# Patient Record
Sex: FEMALE | Race: OTHER | HISPANIC OR LATINO
[De-identification: names, ages, dates, MRNs, and addresses within clinical notes are randomized per-mention and may not be internally consistent; named-entity substitution may affect disease eponyms.]

---

## 2018-10-19 PROBLEM — Z00.00 ENCOUNTER FOR PREVENTIVE HEALTH EXAMINATION: Status: ACTIVE | Noted: 2018-10-19

## 2018-12-06 ENCOUNTER — APPOINTMENT (OUTPATIENT)
Dept: INTERNAL MEDICINE | Facility: CLINIC | Age: 53
End: 2018-12-06

## 2019-10-20 ENCOUNTER — RESULT REVIEW (OUTPATIENT)
Age: 54
End: 2019-10-20

## 2019-10-20 ENCOUNTER — INPATIENT (INPATIENT)
Facility: HOSPITAL | Age: 54
LOS: 0 days | Discharge: HOME | End: 2019-10-21
Attending: OBSTETRICS & GYNECOLOGY | Admitting: OBSTETRICS & GYNECOLOGY
Payer: COMMERCIAL

## 2019-10-20 VITALS
RESPIRATION RATE: 17 BRPM | TEMPERATURE: 97 F | DIASTOLIC BLOOD PRESSURE: 71 MMHG | OXYGEN SATURATION: 100 % | SYSTOLIC BLOOD PRESSURE: 128 MMHG | HEART RATE: 89 BPM

## 2019-10-20 LAB
ALBUMIN SERPL ELPH-MCNC: 4.2 G/DL — SIGNIFICANT CHANGE UP (ref 3.5–5.2)
ALP SERPL-CCNC: 63 U/L — SIGNIFICANT CHANGE UP (ref 30–115)
ALT FLD-CCNC: 9 U/L — SIGNIFICANT CHANGE UP (ref 0–41)
ANION GAP SERPL CALC-SCNC: 14 MMOL/L — SIGNIFICANT CHANGE UP (ref 7–14)
AST SERPL-CCNC: 14 U/L — SIGNIFICANT CHANGE UP (ref 0–41)
BASOPHILS # BLD AUTO: 0.03 K/UL — SIGNIFICANT CHANGE UP (ref 0–0.2)
BASOPHILS NFR BLD AUTO: 0.7 % — SIGNIFICANT CHANGE UP (ref 0–1)
BILIRUB SERPL-MCNC: 0.3 MG/DL — SIGNIFICANT CHANGE UP (ref 0.2–1.2)
BLD GP AB SCN SERPL QL: SIGNIFICANT CHANGE UP
BUN SERPL-MCNC: 10 MG/DL — SIGNIFICANT CHANGE UP (ref 10–20)
CALCIUM SERPL-MCNC: 8.9 MG/DL — SIGNIFICANT CHANGE UP (ref 8.5–10.1)
CHLORIDE SERPL-SCNC: 103 MMOL/L — SIGNIFICANT CHANGE UP (ref 98–110)
CO2 SERPL-SCNC: 22 MMOL/L — SIGNIFICANT CHANGE UP (ref 17–32)
CREAT SERPL-MCNC: 0.7 MG/DL — SIGNIFICANT CHANGE UP (ref 0.7–1.5)
EOSINOPHIL # BLD AUTO: 0.2 K/UL — SIGNIFICANT CHANGE UP (ref 0–0.7)
EOSINOPHIL NFR BLD AUTO: 4.4 % — SIGNIFICANT CHANGE UP (ref 0–8)
GLUCOSE SERPL-MCNC: 104 MG/DL — HIGH (ref 70–99)
HCT VFR BLD CALC: 30.4 % — LOW (ref 37–47)
HCT VFR BLD CALC: 34.1 % — LOW (ref 37–47)
HGB BLD-MCNC: 10.5 G/DL — LOW (ref 12–16)
HGB BLD-MCNC: 11.8 G/DL — LOW (ref 12–16)
IMM GRANULOCYTES NFR BLD AUTO: 0.2 % — SIGNIFICANT CHANGE UP (ref 0.1–0.3)
LYMPHOCYTES # BLD AUTO: 1 K/UL — LOW (ref 1.2–3.4)
LYMPHOCYTES # BLD AUTO: 22.2 % — SIGNIFICANT CHANGE UP (ref 20.5–51.1)
MCHC RBC-ENTMCNC: 29.4 PG — SIGNIFICANT CHANGE UP (ref 27–31)
MCHC RBC-ENTMCNC: 29.4 PG — SIGNIFICANT CHANGE UP (ref 27–31)
MCHC RBC-ENTMCNC: 34.5 G/DL — SIGNIFICANT CHANGE UP (ref 32–37)
MCHC RBC-ENTMCNC: 34.6 G/DL — SIGNIFICANT CHANGE UP (ref 32–37)
MCV RBC AUTO: 84.8 FL — SIGNIFICANT CHANGE UP (ref 81–99)
MCV RBC AUTO: 85.2 FL — SIGNIFICANT CHANGE UP (ref 81–99)
MONOCYTES # BLD AUTO: 0.32 K/UL — SIGNIFICANT CHANGE UP (ref 0.1–0.6)
MONOCYTES NFR BLD AUTO: 7.1 % — SIGNIFICANT CHANGE UP (ref 1.7–9.3)
NEUTROPHILS # BLD AUTO: 2.95 K/UL — SIGNIFICANT CHANGE UP (ref 1.4–6.5)
NEUTROPHILS NFR BLD AUTO: 65.4 % — SIGNIFICANT CHANGE UP (ref 42.2–75.2)
NRBC # BLD: 0 /100 WBCS — SIGNIFICANT CHANGE UP (ref 0–0)
NRBC # BLD: 0 /100 WBCS — SIGNIFICANT CHANGE UP (ref 0–0)
PLATELET # BLD AUTO: 202 K/UL — SIGNIFICANT CHANGE UP (ref 130–400)
PLATELET # BLD AUTO: 234 K/UL — SIGNIFICANT CHANGE UP (ref 130–400)
POTASSIUM SERPL-MCNC: 4.1 MMOL/L — SIGNIFICANT CHANGE UP (ref 3.5–5)
POTASSIUM SERPL-SCNC: 4.1 MMOL/L — SIGNIFICANT CHANGE UP (ref 3.5–5)
PROT SERPL-MCNC: 6.8 G/DL — SIGNIFICANT CHANGE UP (ref 6–8)
RBC # BLD: 3.57 M/UL — LOW (ref 4.2–5.4)
RBC # BLD: 4.02 M/UL — LOW (ref 4.2–5.4)
RBC # FLD: 12.8 % — SIGNIFICANT CHANGE UP (ref 11.5–14.5)
RBC # FLD: 12.9 % — SIGNIFICANT CHANGE UP (ref 11.5–14.5)
SODIUM SERPL-SCNC: 139 MMOL/L — SIGNIFICANT CHANGE UP (ref 135–146)
WBC # BLD: 4.51 K/UL — LOW (ref 4.8–10.8)
WBC # BLD: 5.05 K/UL — SIGNIFICANT CHANGE UP (ref 4.8–10.8)
WBC # FLD AUTO: 4.51 K/UL — LOW (ref 4.8–10.8)
WBC # FLD AUTO: 5.05 K/UL — SIGNIFICANT CHANGE UP (ref 4.8–10.8)

## 2019-10-20 PROCEDURE — 58100 BIOPSY OF UTERUS LINING: CPT

## 2019-10-20 PROCEDURE — 88305 TISSUE EXAM BY PATHOLOGIST: CPT | Mod: 26

## 2019-10-20 PROCEDURE — 99285 EMERGENCY DEPT VISIT HI MDM: CPT

## 2019-10-20 PROCEDURE — 76830 TRANSVAGINAL US NON-OB: CPT | Mod: 26

## 2019-10-20 PROCEDURE — 99222 1ST HOSP IP/OBS MODERATE 55: CPT | Mod: 25,GC

## 2019-10-20 RX ORDER — ONDANSETRON 8 MG/1
4 TABLET, FILM COATED ORAL EVERY 6 HOURS
Refills: 0 | Status: DISCONTINUED | OUTPATIENT
Start: 2019-10-20 | End: 2019-10-21

## 2019-10-20 RX ORDER — SODIUM CHLORIDE 9 MG/ML
1000 INJECTION, SOLUTION INTRAVENOUS
Refills: 0 | Status: DISCONTINUED | OUTPATIENT
Start: 2019-10-20 | End: 2019-10-21

## 2019-10-20 RX ORDER — SODIUM CHLORIDE 9 MG/ML
1000 INJECTION, SOLUTION INTRAVENOUS ONCE
Refills: 0 | Status: COMPLETED | OUTPATIENT
Start: 2019-10-20 | End: 2019-10-20

## 2019-10-20 RX ORDER — ACETAMINOPHEN 500 MG
650 TABLET ORAL ONCE
Refills: 0 | Status: COMPLETED | OUTPATIENT
Start: 2019-10-20 | End: 2019-10-20

## 2019-10-20 RX ADMIN — Medication 650 MILLIGRAM(S): at 18:51

## 2019-10-20 RX ADMIN — SODIUM CHLORIDE 1000 MILLILITER(S): 9 INJECTION, SOLUTION INTRAVENOUS at 12:35

## 2019-10-20 RX ADMIN — SODIUM CHLORIDE 150 MILLILITER(S): 9 INJECTION, SOLUTION INTRAVENOUS at 18:51

## 2019-10-20 RX ADMIN — ONDANSETRON 4 MILLIGRAM(S): 8 TABLET, FILM COATED ORAL at 20:42

## 2019-10-20 RX ADMIN — SODIUM CHLORIDE 1000 MILLILITER(S): 9 INJECTION, SOLUTION INTRAVENOUS at 13:35

## 2019-10-20 NOTE — ED PROVIDER NOTE - PROGRESS NOTE DETAILS
dionte caldwell, evaluated the patient, did endometrial scraping. pending obgyn dispo cleared for dc by obgyluis, pt to follow up with dr jordan tomorrow. Patient to be discharged from ED. Any available test results were discussed with patient and/or family and/or caregiver. Verbal instructions given, including instructions to return to ED immediately for any new, worsening, or concerning symptoms. Patient and/or family and/or caregiver endorsed understanding. Written discharge instructions additionally given, including follow-up plan. cleared for dc by martha caldwell to follow up with dr jordan tomorrow. repeat cbc noted, obgyn paged dr jacquelyn park, will admit to his service

## 2019-10-20 NOTE — ED ADULT NURSE NOTE - OBJECTIVE STATEMENT
Pt c/o vaginal bleeding which has increased over the past 3 days. Pt has not menstruated in a year. Denies fever, chills, painful urination, or back pain.

## 2019-10-20 NOTE — ED PROVIDER NOTE - PHYSICAL EXAMINATION
CONSTITUTIONAL: Well-developed; well-nourished; in no acute distress, speaking in full sentences  SKIN: warm, dry  HEAD: Normocephalic; atraumatic  EYES: PERRL, EOMI, no conjunctival erythema  ENT: No nasal discharge; airway clear, mucous membranes moist  NECK: Supple; non tender, FROM  CARD: no cyanosis, radial 2+  RESP: No increased wob  ABD: soft ntnd, no rebound, no guarding, no rigidity, neg murphys  EXT: moves all extremities, ambulates wo assistance No clubbing, cyanosis or edema.   NEURO: Alert, oriented, grossly unremarkable, no focal deficits, cn ii-xii grossly intact  PSYCH: Cooperative, appropriate   plevic exam done with chaperone present: no cmt nor adnexal tenderness, +blood pool in vault

## 2019-10-20 NOTE — CONSULT NOTE ADULT - ASSESSMENT
53yo postmenopausal P2, with vaginal bleeding, currently hemodynamically and clinically stable, s/p endometrial biopsy.  -f/u with Dr. Angelo on 10/21   -Bleeding precautions given  -f/u Endometrial biopsy   -Motrin for pain 600mg q6 hours as needed   -Disposition per ED     Dr. Garcia and Dr. Leyva aware 53yo postmenopausal P2, with vaginal bleeding, currently hemodynamically and clinically stable, s/p endometrial biopsy.  -F/u with Dr. Angelo on 10/21  -Bleeding precautions given  -F/u Endometrial biopsy  -Motrin for pain 600mg q6 hours as needed  -Disposition per ED    Dr. Garcia and Dr. Leyva aware

## 2019-10-20 NOTE — CONSULT NOTE ADULT - SUBJECTIVE AND OBJECTIVE BOX
Chief Complaint: Vaginal bleeding    HPI: 52yo , postmenopausal, with vaginal bleeding for 2 days. She reports a total of 7 pads per day for 2 days, that started on 10/19 in the AM. She reports sharp, RLQ abdominal pain that started friday night. She denies abnormal or foul smelling discharge. She denies vaginal bleeding in the past, and denies lightheadedness or dizziness. Reports hx of endometrial biopsy 1 year ago for heavy bleeding, normal per patient.  Denies Hormonal therapy, DVT, heart disease or CVA history. Denies HA, CP, SOB, N/V, LE pain/ swelling, diarrhea, pain/difficulty with urination, fevers, chills. Follows with Dr. Angelo.      Ob/Gyn History:  , LMP 2018; NSVDX2 at [facility]   Denies history of ovarian cysts, uterine fibroids, abnormal paps, or STIs  Last Pap Smear -   Last Mammogram - 2018  Last Colonoscopy - Not done     Denies the following: constitutional symptoms, visual symptoms, cardiovascular symptoms, respiratory symptoms, GI symptoms, musculoskeletal symptoms, skin symptoms, neurologic symptoms, hematologic symptoms, allergic symptoms, psychiatric symptoms  Except any pertinent positives listed.     Home Medications: None    Allergies: No Known Allergies    PAST MEDICAL & SURGICAL HISTORY:   Denies     FAMILY HISTORY: Reports Maternal gastric cancer      SOCIAL HISTORY: Denies cigarette use, alcohol use, or illicit drug use    Vital Signs Last 24 Hrs  T(F): 97.3 (20 Oct 2019 11:47), Max: 97.3 (20 Oct 2019 11:47)  HR: 89 (20 Oct 2019 11:47) (89 - 89)  BP: 128/71 (20 Oct 2019 11:47) (128/71 - 128/71)  RR: 17 (20 Oct 2019 11:47) (17 - 17)    General Appearance - AAOx3, NAD  Heart - S1S2 regular rate and rhythm  Lung - CTA Bilaterally  Abdomen - Soft, nontender, nondistended, no rebound, no rigidity, no guarding, bowel sounds present.     GYN/Pelvis:  Labia Majora - Normal  Labia Minora - Normal  Clitoris - Normal  Urethra - Normal  Vagina - Normal; 10cc of moderate blood in vaginal canal   Cervix - Normal; closed, moderate no active bleeding; no cMT    Uterus:  Size - Normal, 6w size  Tenderness - None  Mass - None  Freely mobile    Adnexa:  Masses - None  Tenderness - None      PROCEDURE:  R/B/A of procedure obtained, all questions answered, consent obtained. Pt placed in lithotomy position, speculum placed, cervix visualized, betadine used to clean the cervix, endometrial biopsy pipelle easily placed through cervix, 3 passes made with the pipelle, pt tolerated the procedure well, no bleeding or pain post procedure. Specimen taken to pathology.    LABS:                        11.8   4.51  )-----------( 234      ( 20 Oct 2019 12:13 )             34.1       ABO RH Interpretation: O POS (10-20-19 @ 12:13)  Antibody Screen: NEG (10-20-19 @ 12:13)    10-20    139  |  103  |  10  ----------------------------<  104<H>  4.1   |  22  |  0.7    Ca    8.9      20 Oct 2019 12:13    TPro  6.8  /  Alb  4.2  /  TBili  0.3  /  DBili  x   /  AST  14  /  ALT  9   /  AlkPhos  63  10-20    UPREG negative         RADIOLOGY & ADDITIONAL STUDIES:  < from: US Transvaginal (10.20.19 @ 13:17) >  UTERUS: Anteverted measuring 10.4 x 7.0 x 8.0 and contains multiple   fibroids measuring up to 4.2 cm., The endometrial echo complex is   homogeneously echogenic and measures 2.5 cm, which is abnormally   thickened.  RIGHT OVARY: measures 3.1 x 2.9 x 2.4 cm and contains a 2.7 cm cyst.   Doppler flow is demonstrated to the right ovary.   LEFT OVARY: measures 1.9 x 1.4 x 1.3 cm, and is unremarkable. Doppler   flow is demonstrated to the left ovary.   OTHER: No free fluid in the pelvis.  IMPRESSION:  Endometrial thickening up to 2.5 cm. This is abnormally thickened for   postmenopausal woman and gynecologic consultation suggested  < end of copied text > Chief Complaint: Vaginal bleeding    HPI: 53yo , postmenopausal, with vaginal bleeding for 2 days. She reports a total of 7 pads per day for 2 days, that started on 10/19 in the AM. She reports sharp, RLQ abdominal pain that started friday night. She denies abnormal or foul smelling discharge. She denies vaginal bleeding in the past, and denies lightheadedness or dizziness. Reports hx of endometrial biopsy 1 year ago for heavy bleeding, normal per patient.  Denies Hormonal therapy, DVT, heart disease or CVA history. Denies HA, CP, SOB, N/V, LE pain/ swelling, diarrhea, pain/difficulty with urination, fevers, chills. Follows with Dr. Angelo.      Ob/Gyn History:  , LMP 2018; NSVDX2 at [facility]   Denies history of ovarian cysts, uterine fibroids, abnormal paps, or STIs  Last Pap Smear -   Last Mammogram - 2018  Last Colonoscopy - Not done     Denies the following: constitutional symptoms, visual symptoms, cardiovascular symptoms, respiratory symptoms, GI symptoms, musculoskeletal symptoms, skin symptoms, neurologic symptoms, hematologic symptoms, allergic symptoms, psychiatric symptoms  Except any pertinent positives listed.     Home Medications: None  Allergies: No Known Allergies  PAST MEDICAL & SURGICAL HISTORY:   Denies   FAMILY HISTORY: Reports Maternal gastric cancer   SOCIAL HISTORY: Denies cigarette use, alcohol use, or illicit drug use    Vital Signs Last 24 Hrs  T(F): 97.3 (20 Oct 2019 11:47), Max: 97.3 (20 Oct 2019 11:47)  HR: 89 (20 Oct 2019 11:47) (89 - 89)  BP: 128/71 (20 Oct 2019 11:47) (128/71 - 128/71)  RR: 17 (20 Oct 2019 11:47) (17 - 17)    General Appearance - AAOx3, NAD  Heart - S1S2 regular rate and rhythm  Lung - CTA Bilaterally  Abdomen - Soft, nontender, nondistended, no rebound, no rigidity, no guarding, bowel sounds present.     GYN/Pelvis:  Labia Majora - Normal  Labia Minora - Normal  Clitoris - Normal  Urethra - Normal  Vagina - Normal; 10cc of moderate blood in vaginal canal   Cervix - Normal; closed, moderate no active bleeding; no CMT    Uterus:  Size - Normal, 6w size  Tenderness - None  Mass - None  Freely mobile    Adnexa:  Masses - None  Tenderness - None      PROCEDURE:  R/B/A of procedure obtained, all questions answered, consent obtained. Pt placed in lithotomy position, speculum placed, cervix visualized, betadine used to clean the cervix, endometrial biopsy pipelle easily placed through cervix, 3 passes made with the pipelle, pt tolerated the procedure well, no bleeding or pain post procedure. Specimen taken to pathology.    LABS:                        11.8   4.51  )-----------( 234      ( 20 Oct 2019 12:13 )             34.1       ABO RH Interpretation: O POS (10-20-19 @ 12:13)  Antibody Screen: NEG (10-20-19 @ 12:13)    10-20    139  |  103  |  10  ----------------------------<  104<H>  4.1   |  22  |  0.7    Ca    8.9      20 Oct 2019 12:13    TPro  6.8  /  Alb  4.2  /  TBili  0.3  /  DBili  x   /  AST  14  /  ALT  9   /  AlkPhos  63  10-20    UPREG negative         RADIOLOGY & ADDITIONAL STUDIES:  < from: US Transvaginal (10.20.19 @ 13:17) >  UTERUS: Anteverted measuring 10.4 x 7.0 x 8.0 and contains multiple   fibroids measuring up to 4.2 cm., The endometrial echo complex is   homogeneously echogenic and measures 2.5 cm, which is abnormally   thickened.  RIGHT OVARY: measures 3.1 x 2.9 x 2.4 cm and contains a 2.7 cm cyst.   Doppler flow is demonstrated to the right ovary.   LEFT OVARY: measures 1.9 x 1.4 x 1.3 cm, and is unremarkable. Doppler   flow is demonstrated to the left ovary.   OTHER: No free fluid in the pelvis.  IMPRESSION:  Endometrial thickening up to 2.5 cm. This is abnormally thickened for   postmenopausal woman and gynecologic consultation suggested  < end of copied text >

## 2019-10-20 NOTE — H&P ADULT - ASSESSMENT
53yo postmenopausal P2, with vaginal bleeding, currently hemodynamically and clinically stable, s/p endometrial biopsy.  -F/u with Dr. Angelo on 10/21  -Bleeding precautions given  -F/u Endometrial biopsy  -Motrin for pain 600mg q6 hours as needed  -Disposition per ED    Dr. Garcia and Dr. Leyva aware 55yo postmenopausal P2, with vaginal bleeding, currently hemodynamically and clinically stable, s/p endometrial biopsy.    -Admit  -Pad counts  -Monitor VS per routine  -F/u Endometrial biopsy  -Motrin for pain 600mg q6 hours as needed    Dr. Garcia and Dr. Leyva aware

## 2019-10-20 NOTE — H&P ADULT - NSHPLABSRESULTS_GEN_ALL_CORE
LABS:                        11.8   4.51  )-----------( 234      ( 20 Oct 2019 12:13 )             34.1       ABO RH Interpretation: O POS (10-20-19 @ 12:13)  Antibody Screen: NEG (10-20-19 @ 12:13)    10-20    139  |  103  |  10  ----------------------------<  104<H>  4.1   |  22  |  0.7    Ca    8.9      20 Oct 2019 12:13    TPro  6.8  /  Alb  4.2  /  TBili  0.3  /  DBili  x   /  AST  14  /  ALT  9   /  AlkPhos  63  10-20    UPREG negative         RADIOLOGY & ADDITIONAL STUDIES:  < from: US Transvaginal (10.20.19 @ 13:17) >  UTERUS: Anteverted measuring 10.4 x 7.0 x 8.0 and contains multiple   fibroids measuring up to 4.2 cm., The endometrial echo complex is   homogeneously echogenic and measures 2.5 cm, which is abnormally   thickened.  RIGHT OVARY: measures 3.1 x 2.9 x 2.4 cm and contains a 2.7 cm cyst.   Doppler flow is demonstrated to the right ovary.   LEFT OVARY: measures 1.9 x 1.4 x 1.3 cm, and is unremarkable. Doppler   flow is demonstrated to the left ovary.   OTHER: No free fluid in the pelvis.  IMPRESSION:  Endometrial thickening up to 2.5 cm. This is abnormally thickened for   postmenopausal woman and gynecologic consultation suggested  < end of copied text >

## 2019-10-20 NOTE — ED PROVIDER NOTE - OBJECTIVE STATEMENT
53yo F no sig pmhx presents for vaginal bleeding x3 days, began as light spotting progressed to today where she is using 6pads/hour. no loc. pt states it has been over a year since last menstruation. no fevers, chills, n/v, dysuria.     upreg negative by rn  obgyn: nikki  pmd: [facility]

## 2019-10-20 NOTE — ED PROVIDER NOTE - NS ED ROS FT
Constitutional: (-) fever (-) vomiting  Eyes/ENT: (-) vision changes  Cardiovascular: (-) chest pain, (-) sob  Respiratory: (-) cough, (-) shortness of breath  Gastrointestinal: (-) vomiting,   : (-) dysuria   Musculoskeletal: (-) back pain  Integumentary: (-) rash, (-) edema  Neurological: (-)loc  Allergic/Immunologic: (-) pruritus  Endocrine: No history of thyroid disease or diabetes.

## 2019-10-20 NOTE — H&P ADULT - NSHPPHYSICALEXAM_GEN_ALL_CORE
Vital Signs Last 24 Hrs  T(F): 97.3 (20 Oct 2019 11:47), Max: 97.3 (20 Oct 2019 11:47)  HR: 89 (20 Oct 2019 11:47) (89 - 89)  BP: 128/71 (20 Oct 2019 11:47) (128/71 - 128/71)  RR: 17 (20 Oct 2019 11:47) (17 - 17)    General Appearance - AAOx3, NAD  Heart - S1S2 regular rate and rhythm  Lung - CTA Bilaterally  Abdomen - Soft, nontender, nondistended, no rebound, no rigidity, no guarding, bowel sounds present.     GYN/Pelvis:  Labia Majora - Normal  Labia Minora - Normal  Clitoris - Normal  Urethra - Normal  Vagina - Normal; 10cc of moderate blood in vaginal canal   Cervix - Normal; closed, moderate no active bleeding; no CMT    Uterus:  Size - Normal, 6w size  Tenderness - None  Mass - None  Freely mobile    Adnexa:  Masses - None  Tenderness - None Vital Signs Last 24 Hrs  T(F): 97.3 (20 Oct 2019 11:47), Max: 97.3 (20 Oct 2019 11:47)  HR: 89 (20 Oct 2019 11:47) (89 - 89)  BP: 128/71 (20 Oct 2019 11:47) (128/71 - 128/71)  RR: 17 (20 Oct 2019 11:47) (17 - 17)    Denies the following: constitutional symptoms, visual symptoms, cardiovascular symptoms, respiratory symptoms, GI symptoms, musculoskeletal symptoms, skin symptoms, neurologic symptoms, hematologic symptoms, allergic symptoms, or psychiatric symptoms, except any pertinent positives listed.      General Appearance - AAOx3, NAD  Heart - S1S2 regular rate and rhythm  Lung - CTA Bilaterally  Abdomen - Soft, nontender, nondistended, no rebound, no rigidity, no guarding, bowel sounds present.     GYN/Pelvis:  Labia Majora - Normal  Labia Minora - Normal  Clitoris - Normal  Urethra - Normal  Vagina - Normal; 10cc of moderate blood in vaginal canal   Cervix - Normal; closed, moderate no active bleeding; no CMT    Uterus:  Size - Normal, 6w size  Tenderness - None  Mass - None  Freely mobile    Adnexa:  Masses - None  Tenderness - None

## 2019-10-20 NOTE — H&P ADULT - HISTORY OF PRESENT ILLNESS
PI: 55yo , postmenopausal, with vaginal bleeding for 2 days. She reports a total of 7 pads per day for 2 days, that started on 10/19 in the AM. She reports sharp, RLQ abdominal pain that started friday night. She denies abnormal or foul smelling discharge. She denies vaginal bleeding in the past, and denies lightheadedness or dizziness. Reports hx of endometrial biopsy 1 year ago for heavy bleeding, normal per patient.  Denies Hormonal therapy, DVT, heart disease or CVA history. Denies HA, CP, SOB, N/V, LE pain/ swelling, diarrhea, pain/difficulty with urination, fevers, chills. Follows with Dr. Angelo.      Ob/Gyn History:  , LMP 2018; NSVDX2 at [facility]   Denies history of ovarian cysts, uterine fibroids, abnormal paps, or STIs  Last Pap Smear -   Last Mammogram - 2018  Last Colonoscopy - Not done     Denies the following: constitutional symptoms, visual symptoms, cardiovascular symptoms, respiratory symptoms, GI symptoms, musculoskeletal symptoms, skin symptoms, neurologic symptoms, hematologic symptoms, allergic symptoms, psychiatric symptoms  Except any pertinent positives listed.     Home Medications: None  Allergies: No Known Allergies  PAST MEDICAL & SURGICAL HISTORY:   Denies   FAMILY HISTORY: Reports Maternal gastric cancer   SOCIAL HISTORY: Denies cigarette use, alcohol use, or illicit drug use

## 2019-10-20 NOTE — ED PROVIDER NOTE - CLINICAL SUMMARY MEDICAL DECISION MAKING FREE TEXT BOX
Pt w/ vaginal bleeding. US neg for acute pathology. Hb dropped 1point in ED, not dilutional. Pt endorses bleeding through 6 pads an hour since yesterday, and continues to bleed in ED. + dizziness. Case discussed with Pt, expresses concern b/c still bleeding with dizziness. Case discussed Dr. Márquez who accepts pt to his service.

## 2019-10-21 ENCOUNTER — TRANSCRIPTION ENCOUNTER (OUTPATIENT)
Age: 54
End: 2019-10-21

## 2019-10-21 VITALS
RESPIRATION RATE: 18 BRPM | OXYGEN SATURATION: 97 % | HEART RATE: 68 BPM | SYSTOLIC BLOOD PRESSURE: 135 MMHG | TEMPERATURE: 98 F | DIASTOLIC BLOOD PRESSURE: 75 MMHG

## 2019-10-21 LAB
BASOPHILS # BLD AUTO: 0.02 K/UL — SIGNIFICANT CHANGE UP (ref 0–0.2)
BASOPHILS NFR BLD AUTO: 0.4 % — SIGNIFICANT CHANGE UP (ref 0–1)
EOSINOPHIL # BLD AUTO: 0.16 K/UL — SIGNIFICANT CHANGE UP (ref 0–0.7)
EOSINOPHIL NFR BLD AUTO: 3.6 % — SIGNIFICANT CHANGE UP (ref 0–8)
HCT VFR BLD CALC: 27.9 % — LOW (ref 37–47)
HGB BLD-MCNC: 9.6 G/DL — LOW (ref 12–16)
IMM GRANULOCYTES NFR BLD AUTO: 0.2 % — SIGNIFICANT CHANGE UP (ref 0.1–0.3)
LYMPHOCYTES # BLD AUTO: 1.36 K/UL — SIGNIFICANT CHANGE UP (ref 1.2–3.4)
LYMPHOCYTES # BLD AUTO: 30.4 % — SIGNIFICANT CHANGE UP (ref 20.5–51.1)
MCHC RBC-ENTMCNC: 29.1 PG — SIGNIFICANT CHANGE UP (ref 27–31)
MCHC RBC-ENTMCNC: 34.4 G/DL — SIGNIFICANT CHANGE UP (ref 32–37)
MCV RBC AUTO: 84.5 FL — SIGNIFICANT CHANGE UP (ref 81–99)
MONOCYTES # BLD AUTO: 0.28 K/UL — SIGNIFICANT CHANGE UP (ref 0.1–0.6)
MONOCYTES NFR BLD AUTO: 6.3 % — SIGNIFICANT CHANGE UP (ref 1.7–9.3)
NEUTROPHILS # BLD AUTO: 2.65 K/UL — SIGNIFICANT CHANGE UP (ref 1.4–6.5)
NEUTROPHILS NFR BLD AUTO: 59.1 % — SIGNIFICANT CHANGE UP (ref 42.2–75.2)
NRBC # BLD: 0 /100 WBCS — SIGNIFICANT CHANGE UP (ref 0–0)
PLATELET # BLD AUTO: 197 K/UL — SIGNIFICANT CHANGE UP (ref 130–400)
RBC # BLD: 3.3 M/UL — LOW (ref 4.2–5.4)
RBC # FLD: 12.8 % — SIGNIFICANT CHANGE UP (ref 11.5–14.5)
WBC # BLD: 4.48 K/UL — LOW (ref 4.8–10.8)
WBC # FLD AUTO: 4.48 K/UL — LOW (ref 4.8–10.8)

## 2019-10-21 PROCEDURE — 99232 SBSQ HOSP IP/OBS MODERATE 35: CPT

## 2019-10-21 NOTE — DISCHARGE NOTE NURSING/CASE MANAGEMENT/SOCIAL WORK - PATIENT PORTAL LINK FT
You can access the FollowMyHealth Patient Portal offered by [facility] by registering at the following website: [email or web address]. By joining [facility]’s FollowMyHealth portal, you will also be able to view your health information using other applications (apps) compatible with our system.

## 2019-10-21 NOTE — DISCHARGE NOTE PROVIDER - CARE PROVIDER_API CALL
Chan Angelo)  Obstetrics and Gynecology  [address] [place] [id number]  Phone: [phone]  Fax: [phone]  Follow Up Time:

## 2019-10-21 NOTE — PROGRESS NOTE ADULT - ASSESSMENT
55yo postmenopausal female, with postmenopausal bleeding, s/p endometrial biopsy on 10/20, observed overnight, no signs of anemia, to be discharged home to follow up with GYN outpatient today.   -bleeding precautions given  -See Dr. Angelo at 1200    Dr. Garcia aware, Dr. Willingham aware

## 2019-10-21 NOTE — CHART NOTE - NSCHARTNOTEFT_GEN_A_CORE
PGY2 note    Patient seen and evaluated at bedside with Dr. Cote. CBC from this AM was reviewed. Patient reports her bleeding is improved than when she first presented. Denies headache, dizziness, palpitations at rest or on ambulation. Orthostatics were done and negative. Patient was offered Progesterone for discharge but she declined. She reports she'll up with Dr. Angelo at the office this afternoon upon discharge.      Dr. Cote at bedside PGY2 note    Patient seen and evaluated at bedside with Dr. Cote. CBC from this AM was reviewed. Patient reports her bleeding is improved than when she first presented. Denies headache, dizziness, palpitations, lightheadedness at rest or on ambulation. Orthostatics were done and negative.     Patient was offered Progesterone for discharge but she declined. She wants to follow up with her GYN Dr. Angelo this afternoon upon discharge.  Dr Angelo's office notified of patient's scenario.     Bleeding precautions given. Patient knows to return if she is soaking more than 2pads/hour or if she feels dizzy, lightheaded, short of breath.   GYN team will follow EMBx results and contact patient.     Dr. Cote at bedside PGY2 note    Patient seen and evaluated at bedside with Dr. Cote. CBC from this AM was reviewed. Patient reports her bleeding is improved than when she first presented. Denies headache, dizziness, palpitations, lightheadedness at rest or on ambulation. Orthostatics were done and negative.     Patient was offered Progesterone to decrease bleeding but she declined. She wants to follow up with her GYN Dr. Angelo this afternoon upon discharge.    Dr Angelo's office notified of patient's scenario.     Bleeding precautions given. Patient knows to return if she is soaking more than 2pads/hour or if she feels dizzy, lightheaded, short of breath.   GYN team will follow EMBx results and contact patient. F/u with Dr Angelo.     Dr. Cote at bedside

## 2019-10-21 NOTE — PROGRESS NOTE ADULT - SUBJECTIVE AND OBJECTIVE BOX
PGY-2 Note:    Patient seen and examined. Reports doing well, denies pain. 5 pads overnight, about 1 pad every 2 hours, saturated. Denies fever, chills, nausea, vomiting, chest pain, palpitations, dizziness, shortness of breath, severe abdominal pain, heavy vaginal bleeding. Is ambulating, tolerating PO, passing flatus.     Physical Exam:  Vital Signs:  T(C): 36.7 (10-21-19 @ 05:24), Max: 36.7 (10-20-19 @ 16:07)  HR: 77 (10-21-19 @ 05:24) (73 - 100)  BP: 123/65 (10-21-19 @ 05:24) (117/75 - 128/71)  RR: 18 (10-21-19 @ 05:24) (17 - 18)  SpO2: 99% (10-21-19 @ 05:24) (98% - 100%)    Gen: NAD, A&Ox3  Heart: S1S2,RRR  Lungs: CTABL  Abd: ND, soft, NT, BS+.   VE: Deferred, no active bleeding  Ext: SCDs, no edema or calf tenderness bilaterally    Labs:                        10.5   5.05  )-----------( 202      ( 20 Oct 2019 15:50 )             30.4     Medications:  lactated ringers. 1000 milliLiter(s) IV Continuous <Continuous>  ondansetron    Tablet 4 milliGRAM(s) Oral every 6 hours PRN

## 2019-10-23 ENCOUNTER — OTHER (OUTPATIENT)
Age: 54
End: 2019-10-23

## 2019-10-23 LAB — SURGICAL PATHOLOGY STUDY: SIGNIFICANT CHANGE UP

## 2019-10-24 DIAGNOSIS — N93.9 ABNORMAL UTERINE AND VAGINAL BLEEDING, UNSPECIFIED: ICD-10-CM

## 2019-10-24 DIAGNOSIS — N95.0 POSTMENOPAUSAL BLEEDING: ICD-10-CM

## 2019-12-06 ENCOUNTER — OTHER (OUTPATIENT)
Age: 54
End: 2019-12-06

## 2020-03-09 NOTE — ED ADULT NURSE NOTE - PRIMARY CARE PROVIDER
Upon Nutritional Assessment by the Registered Dietitian your patient was determined to meet criteria / has evidence of the following diagnosis/diagnoses:          [ ]  Mild Protein Calorie Malnutrition        [ ]  Moderate Protein Calorie Malnutrition        [x ] Severe Protein Calorie Malnutrition        [ ] Unspecified Protein Calorie Malnutrition        [ ] Underweight / BMI <19        [ ] Morbid Obesity / BMI > 40      Findings as based on:  •  Comprehensive nutrition assessment and consultation  •  Calorie counts (nutrient intake analysis)  •  Food acceptance and intake status from observations by staff  •  Follow up  •  Patient education  •  Intervention secondary to interdisciplinary rounds  •   concerns      Treatment:    The following diet has been recommended:      PROVIDER Section:     By signing this assessment you are acknowledging and agree with the diagnosis/diagnoses assigned by the Registered Dietitian    Comments:  change diet to carbohydrate consistent , DASH/TLC, renal replacement pmd

## 2022-06-14 PROBLEM — Z78.9 OTHER SPECIFIED HEALTH STATUS: Chronic | Status: ACTIVE | Noted: 2019-10-20

## 2022-09-05 ENCOUNTER — NON-APPOINTMENT (OUTPATIENT)
Age: 57
End: 2022-09-05

## 2023-03-26 ENCOUNTER — NON-APPOINTMENT (OUTPATIENT)
Age: 58
End: 2023-03-26